# Patient Record
Sex: FEMALE | Race: WHITE
[De-identification: names, ages, dates, MRNs, and addresses within clinical notes are randomized per-mention and may not be internally consistent; named-entity substitution may affect disease eponyms.]

---

## 2019-08-14 ENCOUNTER — HOSPITAL ENCOUNTER (OUTPATIENT)
Dept: HOSPITAL 92 - BICULT | Age: 59
Discharge: HOME | End: 2019-08-14
Attending: FAMILY MEDICINE
Payer: MEDICARE

## 2019-08-14 DIAGNOSIS — K80.20: ICD-10-CM

## 2019-08-14 DIAGNOSIS — R10.9: Primary | ICD-10-CM

## 2019-08-14 PROCEDURE — 76705 ECHO EXAM OF ABDOMEN: CPT

## 2019-08-14 NOTE — ULT
EXAM: US Gallbladder RUQ



CLINICAL HISTORY: Abdominal pain.



COMPARISON: None.                  



FINDINGS:



Pancreas:  Suboptimal evaluation due to bowel gas.



Liver:Normal echotexture. No hepatic masses or intrahepatic biliary dilatation. Right hepatic lobe me
asures 15 cm



Portal vein: Patent with appropriate directional flow



Gallbladder: Sonographic evidence of cholelithiasis. Gallbladder wall is not thickened. No pericholec
ystic fluid.

Lees's sign:Negative



Bile ducts: Common bile duct diameter is 0.35 cm





Right kidney: No hydronephrosis. Mild renal cortical thinning. Shadowing limits evaluation the lower 
pole.. Right kidney measures grossly 8.8 x 4.2 x 3.8 cm.





IMPRESSION:





1. Sonographic evidence of cholelithiasis without evidence of cholecystitis.

2. Normal hepatic parenchymal echotexture.



Reported By: Lv Johnson 

Electronically Signed:  8/14/2019 8:45 AM
Living

## 2021-09-29 ENCOUNTER — HOSPITAL ENCOUNTER (OUTPATIENT)
Dept: HOSPITAL 92 - BICMAMMO | Age: 61
Discharge: HOME | End: 2021-09-29
Attending: FAMILY MEDICINE
Payer: MEDICARE

## 2021-09-29 DIAGNOSIS — Z12.31: Primary | ICD-10-CM

## 2021-09-29 PROCEDURE — 77063 BREAST TOMOSYNTHESIS BI: CPT

## 2021-09-29 PROCEDURE — 77067 SCR MAMMO BI INCL CAD: CPT
